# Patient Record
Sex: MALE | URBAN - METROPOLITAN AREA
[De-identification: names, ages, dates, MRNs, and addresses within clinical notes are randomized per-mention and may not be internally consistent; named-entity substitution may affect disease eponyms.]

---

## 2020-01-01 ENCOUNTER — EXTERNAL RECORD (OUTPATIENT)
Dept: OTHER | Age: 40
End: 2020-01-01

## 2020-07-18 ENCOUNTER — HOSPITAL (OUTPATIENT)
Dept: OTHER | Age: 40
End: 2020-07-18
Attending: SURGERY

## 2020-07-18 ENCOUNTER — HOSPITAL (OUTPATIENT)
Dept: OTHER | Age: 40
End: 2020-07-18

## 2020-07-18 ENCOUNTER — LAB SERVICES (OUTPATIENT)
Dept: OTHER | Age: 40
End: 2020-07-18

## 2020-07-18 ENCOUNTER — HOSPITAL (OUTPATIENT)
Dept: OTHER | Age: 40
End: 2020-07-18
Attending: FAMILY MEDICINE

## 2020-07-18 LAB
A/G RATIO_: 1.4
A/G RATIO_: 1.4
ABS LYMPH: 2.6 K/CUMM (ref 1–3.5)
ABS LYMPH: 2.6 K/CUMM (ref 1–3.5)
ABS MONO: 0.7 K/CUMM (ref 0.1–0.8)
ABS MONO: 0.7 K/CUMM (ref 0.1–0.8)
ABS NEUTRO: 5.8 K/CUMM (ref 2–8)
ABS NEUTRO: 5.8 K/CUMM (ref 2–8)
ALBUMIN: 4.3 G/DL (ref 3.5–5)
ALBUMIN: 4.3 G/DL (ref 3.5–5)
ALK PHOS: 54 UNIT/L (ref 50–124)
ALK PHOS: 54 UNIT/L (ref 50–124)
ALT/GPT: 84 UNIT/L (ref 0–55)
ALT/GPT: 84 UNIT/L (ref 0–55)
ANION GAP SERPL CALC-SCNC: 14 MEQ/L (ref 10–20)
ANION GAP SERPL CALC-SCNC: 14 MEQ/L (ref 10–20)
AST/GOT: 26 UNIT/L (ref 5–34)
AST/GOT: 26 UNIT/L (ref 5–34)
BASOPHIL: 1 % (ref 0–1)
BASOPHIL: 1 % (ref 0–1)
BILI TOTAL: 0.7 MG/DL (ref 0.2–1)
BILI TOTAL: 0.7 MG/DL (ref 0.2–1)
BUN SERPL-MCNC: 10 MG/DL (ref 6–20)
BUN SERPL-MCNC: 10 MG/DL (ref 6–20)
CALCIUM: 9.6 MG/DL (ref 8.4–10.2)
CALCIUM: 9.6 MG/DL (ref 8.4–10.2)
CHLORIDE: 102 MEQ/L (ref 97–107)
CHLORIDE: 102 MEQ/L (ref 97–107)
CREATININE: 0.9 MG/DL (ref 0.6–1.3)
CREATININE: 0.9 MG/DL (ref 0.6–1.3)
DIFF_TYPE?: NORMAL
EOSINOPHIL: 1 % (ref 0–6)
EOSINOPHIL: 1 % (ref 0–6)
GLOBULIN_: 3.1 G/DL (ref 2–4.1)
GLOBULIN_: 3.1 G/DL (ref 2–4.1)
GLUCOSE LVL: 96 MG/DL (ref 70–99)
GLUCOSE LVL: 96 MG/DL (ref 70–99)
HCT VFR BLD CALC: 46 % (ref 36–51)
HCT VFR BLD CALC: 46 % (ref 36–51)
HEMOLYSIS 2+: NEGATIVE
HGB BLD-MCNC: 15.8 G/DL (ref 12–17)
HGB BLD-MCNC: 15.8 G/DL (ref 12–17)
ICTERIC 4+: NEGATIVE
IMMATURE GRAN: 0.2 % (ref 0–0.3)
IMMATURE GRAN: 0.2 % (ref 0–0.3)
INSTR WBC: 9.2 K/CUMM (ref 4–11)
LIPASE LEVEL: 14 UNIT/L (ref 8–78)
LIPASE LEVEL: 14 UNIT/L (ref 8–78)
LIPEMIC 3+: NEGATIVE
LYMPHOCYTE: 28 %
LYMPHOCYTE: 28 %
MCH RBC QN AUTO: 30 PG (ref 25–35)
MCH RBC QN AUTO: 30 PG (ref 25–35)
MCHC RBC AUTO-ENTMCNC: 34 G/DL (ref 32–37)
MCHC RBC AUTO-ENTMCNC: 34 G/DL (ref 32–37)
MCV RBC AUTO: 87 FL (ref 78–97)
MCV RBC AUTO: 87 FL (ref 78–97)
MONOCYTE: 7 %
MONOCYTE: 7 %
NEUTROPHIL: 63 %
NEUTROPHIL: 63 %
NRBC BLD MANUAL-RTO: 0 % (ref 0–0.2)
NRBC BLD MANUAL-RTO: 0 % (ref 0–0.2)
PLATELET: 290 K/CUMM (ref 150–450)
PLATELET: 290 K/CUMM (ref 150–450)
POTASSIUM: 4.1 MEQ/L (ref 3.5–5.1)
POTASSIUM: 4.1 MEQ/L (ref 3.5–5.1)
RAPID COV19 RESULT (SHERMAN): NOT DETECTED
RAPID INTERP (SHERMAN): NORMAL
RBC # BLD: 5.29 M/CUMM (ref 4.2–6)
RBC # BLD: 5.29 M/CUMM (ref 4.2–6)
RDW: 12.3 % (ref 11.5–14.5)
RDW: 12.3 % (ref 11.5–14.5)
SARS-COV-2 RNA SPEC QL NAA+PROBE: NOT DETECTED
SODIUM: 138 MEQ/L (ref 136–145)
SODIUM: 138 MEQ/L (ref 136–145)
SPECIMEN SOURCE: NORMAL
SURG SPECIMEN: NORMAL
TCO2: 26 MEQ/L (ref 19–29)
TCO2: 26 MEQ/L (ref 19–29)
TOTAL PROTEIN: 7.4 G/DL (ref 6.4–8.3)
TOTAL PROTEIN: 7.4 G/DL (ref 6.4–8.3)
UA APPEAR POC: CLEAR
UA APPEAR POC: CLEAR
UA APPEAR: CLEAR
UA APPEAR: CLEAR
UA BILI POC: NEGATIVE
UA BILI POC: NEGATIVE
UA BILI: NEGATIVE
UA BILI: NEGATIVE
UA BLOOD POC: NEGATIVE
UA BLOOD POC: NEGATIVE
UA BLOOD: NEGATIVE
UA BLOOD: NEGATIVE
UA COLOR POC: YELLOW
UA COLOR POC: YELLOW
UA COLOR: YELLOW
UA COLOR: YELLOW
UA GLUCOSE POC: NEGATIVE
UA GLUCOSE POC: NEGATIVE
UA GLUCOSE: NEGATIVE
UA GLUCOSE: NEGATIVE
UA KETONES POC: NEGATIVE
UA KETONES POC: NEGATIVE
UA KETONES: NEGATIVE
UA KETONES: NEGATIVE
UA LEUK EST POC: NEGATIVE
UA LEUK EST POC: NEGATIVE
UA LEUK EST: NEGATIVE
UA LEUK EST: NEGATIVE
UA NITRITE POC: NEGATIVE
UA NITRITE POC: NEGATIVE
UA NITRITE: NEGATIVE
UA NITRITE: NEGATIVE
UA PH POC: 7 (ref 5–7)
UA PH POC: 7 (ref 5–7)
UA PH: 7 (ref 5–7)
UA PH: 7 (ref 5–7)
UA PROTEIN POC: NEGATIVE
UA PROTEIN POC: NEGATIVE
UA PROTEIN: NEGATIVE
UA PROTEIN: NEGATIVE
UA SPEC GRAV POC: 1.01 (ref 1.01–1.02)
UA SPEC GRAV POC: 1.01 (ref 1.01–1.02)
UA SPEC GRAV: 1.01 (ref 1.01–1.02)
UA SPEC GRAV: 1.01 (ref 1.01–1.02)
UA UROBILIN POC: 0.2 MG/DL
UA UROBILIN POC: 0.2 MG/DL
UA UROBILINOGEN: 0.2 MG/DL (ref 0.2–1)
UA UROBILINOGEN: 0.2 MG/DL (ref 0.2–1)
WBC # BLD: 9.2 K/CUMM (ref 4–11)
WBC # BLD: 9.2 K/CUMM (ref 4–11)

## 2020-07-18 PROCEDURE — 99218 INITIAL OBSERVATION CARE,LEVL I: CPT | Performed by: SURGERY

## 2020-07-18 PROCEDURE — 44970 LAPAROSCOPY APPENDECTOMY: CPT | Performed by: SURGERY

## 2020-07-19 LAB
ABS LYMPH: 2.2 K/CUMM (ref 1–3.5)
ABS MONO: 0.7 K/CUMM (ref 0.1–0.8)
ABS NEUTRO: 5.3 K/CUMM (ref 2–8)
ANION GAP SERPL CALC-SCNC: 12 MEQ/L (ref 10–20)
BASOPHIL: 0 % (ref 0–1)
BUN SERPL-MCNC: 7 MG/DL (ref 6–20)
CALCIUM: 8.6 MG/DL (ref 8.4–10.2)
CHLORIDE: 105 MEQ/L (ref 97–107)
CREATININE: 0.81 MG/DL (ref 0.6–1.3)
DIFF_TYPE?: NORMAL
EOSINOPHIL: 1 % (ref 0–6)
GLUCOSE LVL: 108 MG/DL (ref 70–99)
HCT VFR BLD CALC: 42 % (ref 36–51)
HEMOLYSIS 2+: NEGATIVE
HEMOLYSIS 2+: NEGATIVE
HEMOLYSIS 3+: NEGATIVE
HGB BLD-MCNC: 14.7 G/DL (ref 12–17)
IMMATURE GRAN: 0.2 % (ref 0–0.3)
INSTR WBC: 8.4 K/CUMM (ref 4–11)
LIPEMIC 4+: NEGATIVE
LYMPHOCYTE: 27 %
MAGNESIUM LEVEL: 1.7 MG/DL (ref 1.6–2.6)
MCH RBC QN AUTO: 31 PG (ref 25–35)
MCHC RBC AUTO-ENTMCNC: 35 G/DL (ref 32–37)
MCV RBC AUTO: 89 FL (ref 78–97)
MONOCYTE: 9 %
NEUTROPHIL: 63 %
NRBC BLD MANUAL-RTO: 0 % (ref 0–0.2)
PHOSPHORUS: 3.9 MG/DL (ref 2.3–4.7)
PLATELET: 259 K/CUMM (ref 150–450)
POTASSIUM: 3.9 MEQ/L (ref 3.5–5.1)
RBC # BLD: 4.77 M/CUMM (ref 4.2–6)
RDW: 12.3 % (ref 11.5–14.5)
REPORT TEXT: NORMAL
SODIUM: 139 MEQ/L (ref 136–145)
TCO2: 26 MEQ/L (ref 19–29)
WBC # BLD: 8.4 K/CUMM (ref 4–11)

## 2020-07-19 PROCEDURE — 99024 POSTOP FOLLOW-UP VISIT: CPT | Performed by: SURGERY

## 2020-07-21 LAB — PATHOLOGIST NAME: NORMAL

## 2020-07-28 ENCOUNTER — OFFICE VISIT (OUTPATIENT)
Dept: SURGERY | Age: 40
End: 2020-07-28

## 2020-07-28 DIAGNOSIS — Z90.49 S/P LAPAROSCOPIC APPENDECTOMY: Primary | ICD-10-CM

## 2020-07-28 PROCEDURE — 99024 POSTOP FOLLOW-UP VISIT: CPT | Performed by: SURGERY

## 2020-07-28 RX ORDER — VILAZODONE HYDROCHLORIDE 40 MG/1
20 TABLET ORAL DAILY
COMMUNITY
Start: 2020-05-08

## 2020-07-28 ASSESSMENT — PAIN SCALES - GENERAL: PAINLEVEL: 0

## 2020-08-11 ENCOUNTER — OFFICE VISIT (OUTPATIENT)
Dept: SURGERY | Age: 40
End: 2020-08-11

## 2020-08-11 DIAGNOSIS — Z90.49 S/P LAPAROSCOPIC APPENDECTOMY: Primary | ICD-10-CM

## 2020-08-11 PROCEDURE — 99024 POSTOP FOLLOW-UP VISIT: CPT | Performed by: SURGERY

## 2020-08-11 ASSESSMENT — ENCOUNTER SYMPTOMS
FEVER: 0
CONSTIPATION: 1
CHILLS: 0
DIARRHEA: 0
ABDOMINAL PAIN: 1

## 2021-05-26 VITALS
BODY MASS INDEX: 25.92 KG/M2 | HEART RATE: 77 BPM | TEMPERATURE: 98 F | HEIGHT: 69 IN | DIASTOLIC BLOOD PRESSURE: 96 MMHG | SYSTOLIC BLOOD PRESSURE: 139 MMHG | WEIGHT: 175 LBS | RESPIRATION RATE: 17 BRPM

## 2022-07-29 ENCOUNTER — OFFICE (OUTPATIENT)
Dept: URBAN - METROPOLITAN AREA CLINIC 45 | Facility: CLINIC | Age: 42
End: 2022-07-29

## 2022-07-29 VITALS — HEIGHT: 69 IN | WEIGHT: 175 LBS

## 2022-07-29 DIAGNOSIS — R19.4: ICD-10-CM

## 2022-07-29 DIAGNOSIS — K21.9 GERD: ICD-10-CM

## 2022-07-29 DIAGNOSIS — R11.0 NAUSEA: ICD-10-CM

## 2022-07-29 DIAGNOSIS — Z79.1: ICD-10-CM

## 2022-07-29 DIAGNOSIS — R63.4: ICD-10-CM

## 2022-07-29 PROCEDURE — G0406 INPT/TELE FOLLOW UP 15: HCPCS | Performed by: STUDENT IN AN ORGANIZED HEALTH CARE EDUCATION/TRAINING PROGRAM

## 2022-07-29 PROCEDURE — 99205 OFFICE O/P NEW HI 60 MIN: CPT | Mod: 95 | Performed by: STUDENT IN AN ORGANIZED HEALTH CARE EDUCATION/TRAINING PROGRAM

## 2022-07-29 NOTE — SERVICEHPINOTES
41-year-old male with longstanding acid reflux who presents for EGD and colonoscopy for chronic GERD, nausea, abdominal pain and change in bowel habits. Last EGD was over 10 years ago which he says was normal. He has been on Nexium for 7 years and previously was on Zantac. Denies dysphagia. Has been having severe nausea for the last few months, wakes up in the middle the night with it. He was taking Aleve every other day for back pain for 3 months. He has abdominal cramping. He has had significant constipation for the last 3 months along with lower abdominal cramping. His stools come out in small cecilia. Prior to this he was normal. He denies blood in the stool. He has lost 10 pounds. No family history of GI cancer although his father has had colon polyps. He has tried MiraLAX and fiber but this made his symptoms worse.br visited="true"

## 2022-09-30 ENCOUNTER — AMBULATORY SURGICAL CENTER (OUTPATIENT)
Dept: URBAN - METROPOLITAN AREA SURGERY 41 | Facility: SURGERY | Age: 42
End: 2022-09-30

## 2022-09-30 VITALS
SYSTOLIC BLOOD PRESSURE: 148 MMHG | RESPIRATION RATE: 16 BRPM | OXYGEN SATURATION: 95 % | HEART RATE: 87 BPM | WEIGHT: 175 LBS | HEIGHT: 69 IN | TEMPERATURE: 96.9 F | DIASTOLIC BLOOD PRESSURE: 94 MMHG

## 2022-09-30 DIAGNOSIS — K31.7 POLYP OF STOMACH AND DUODENUM: ICD-10-CM

## 2022-09-30 DIAGNOSIS — Z79.1 LONG TERM (CURRENT) USE OF NON-STEROIDAL NON-INFLAM (NSAID): ICD-10-CM

## 2022-09-30 DIAGNOSIS — R11.0 NAUSEA: ICD-10-CM

## 2022-09-30 DIAGNOSIS — K22.89 OTHER SPECIFIED DISEASE OF ESOPHAGUS: ICD-10-CM

## 2022-09-30 DIAGNOSIS — R63.4 ABNORMAL WEIGHT LOSS: ICD-10-CM

## 2022-09-30 DIAGNOSIS — R19.4 CHANGE IN BOWEL HABIT: ICD-10-CM

## 2022-09-30 LAB — SURGICAL: PDF REPORT: (no result)

## 2022-09-30 PROCEDURE — 45378 DIAGNOSTIC COLONOSCOPY: CPT | Performed by: STUDENT IN AN ORGANIZED HEALTH CARE EDUCATION/TRAINING PROGRAM

## 2022-09-30 PROCEDURE — 43239 EGD BIOPSY SINGLE/MULTIPLE: CPT | Mod: 59 | Performed by: STUDENT IN AN ORGANIZED HEALTH CARE EDUCATION/TRAINING PROGRAM

## 2022-09-30 PROCEDURE — 43251 EGD REMOVE LESION SNARE: CPT | Performed by: STUDENT IN AN ORGANIZED HEALTH CARE EDUCATION/TRAINING PROGRAM

## 2022-10-11 ENCOUNTER — OFFICE (OUTPATIENT)
Dept: URBAN - METROPOLITAN AREA CLINIC 45 | Facility: CLINIC | Age: 42
End: 2022-10-11

## 2022-10-11 VITALS — HEIGHT: 69 IN

## 2022-10-11 DIAGNOSIS — K21.9 GERD: ICD-10-CM

## 2022-10-11 DIAGNOSIS — R11.0 NAUSEA: ICD-10-CM

## 2022-10-11 DIAGNOSIS — K58.1 IRRITABLE BOWEL SYNDROME WITH CONSTIPATION: ICD-10-CM

## 2022-10-11 PROCEDURE — 99213 OFFICE O/P EST LOW 20 MIN: CPT | Mod: 95 | Performed by: STUDENT IN AN ORGANIZED HEALTH CARE EDUCATION/TRAINING PROGRAM

## 2022-10-11 PROCEDURE — G0406 INPT/TELE FOLLOW UP 15: HCPCS | Performed by: STUDENT IN AN ORGANIZED HEALTH CARE EDUCATION/TRAINING PROGRAM

## 2022-10-11 NOTE — SERVICEHPINOTES
42-year-old male who presents for follow-up after EGD and colonoscopy which were performed for nausea, acid reflux, long-term NSAID use, weight loss, change in bowel habits with sudden onset of constipation. The EGD showed a 10 mm pediculated polyp which was removed using cold snare. Polyp was a fundic gland polyp. Gastric biopsies were negative for H. pylori or intestinal metaplasia. Colonoscopy was normal. His symptoms of abdominal cramping have resolved on a low FODMAP diet. He has been taking PPI in the morning which has relieved his nausea and acid reflux. He is no longer taking Pepcid at night. He is still feeling somewhat constipated. There is no family history of GI cancer but his dad has had colon polyps. He is going to check with his dad to see the number of polyps.
fatuma burris The patient is scheduled today for a telehealth visit, due to current mandate for social distancing on account of the COVID-19 Pandemic. The clinician is connected to a secure network. The patient consents to this teleconference being a billable service.   This visit used [email or web address] for a live, interactive audio and video telehealth visit.